# Patient Record
Sex: FEMALE | URBAN - METROPOLITAN AREA
[De-identification: names, ages, dates, MRNs, and addresses within clinical notes are randomized per-mention and may not be internally consistent; named-entity substitution may affect disease eponyms.]

---

## 2023-05-25 ENCOUNTER — OFFICE VISIT (OUTPATIENT)
Dept: URBAN - METROPOLITAN AREA CLINIC 17 | Facility: CLINIC | Age: 73
End: 2023-05-25

## 2023-05-25 RX ORDER — FLUCONAZOLE 150 MG/1
TABLET ORAL
Qty: 1 UNSPECIFIED | Status: ACTIVE | COMMUNITY

## 2023-05-25 RX ORDER — BIMATOPROST 0.1 MG/ML
SOLUTION/ DROPS OPHTHALMIC
Qty: 7.5 MILLILITER | Status: ACTIVE | COMMUNITY

## 2023-05-25 RX ORDER — PREDNISONE 20 MG/1
TABLET ORAL
Qty: 20 TABLET | Status: ACTIVE | COMMUNITY

## 2023-05-25 RX ORDER — NYSTATIN 100000 [USP'U]/G
APPLY DAILY TO SKIN TO AFFECTED AREA TWICE A DAY FOR 2 WEEKS CREAM TOPICAL
Qty: 150 GRAM | Refills: 0 | Status: ACTIVE | COMMUNITY

## 2023-05-25 RX ORDER — DILTIAZEM HYDROCHLORIDE 240 MG/1
TAKE 1 CAPSULE BY MOUTH EVERY DAY FOR 90 DAYS CAPSULE, EXTENDED RELEASE ORAL
Qty: 90 EACH | Refills: 1 | Status: ACTIVE | COMMUNITY

## 2023-05-25 RX ORDER — GABAPENTIN 300 MG/1
CAPSULE ORAL
Qty: 270 CAPSULE | Status: ACTIVE | COMMUNITY

## 2023-05-25 RX ORDER — OLMESARTAN MEDOXOMIL 20 MG/1
TABLET, FILM COATED ORAL
Qty: 90 TABLET | Status: ACTIVE | COMMUNITY

## 2023-05-25 RX ORDER — ALBUTEROL SULFATE 90 UG/1
INHALE 2 PUFFS INTO THE LUNGS EVERY 4 HOURS AS NEEDED FOR 30 DAYS AEROSOL, METERED RESPIRATORY (INHALATION)
Qty: 8.5 GRAM | Refills: 1 | Status: ACTIVE | COMMUNITY

## 2023-05-25 RX ORDER — AZITHROMYCIN DIHYDRATE 500 MG/1
TABLET, FILM COATED ORAL
Qty: 10 TABLET | Status: ACTIVE | COMMUNITY

## 2023-05-25 RX ORDER — FLUTICASONE FUROATE, UMECLIDINIUM BROMIDE AND VILANTEROL TRIFENATATE 100; 62.5; 25 UG/1; UG/1; UG/1
POWDER RESPIRATORY (INHALATION)
Qty: 180 BLISTER | Status: ACTIVE | COMMUNITY

## 2023-06-05 ENCOUNTER — OFFICE VISIT (OUTPATIENT)
Dept: URBAN - METROPOLITAN AREA CLINIC 17 | Facility: CLINIC | Age: 73
End: 2023-06-05

## 2023-07-11 ENCOUNTER — TELEPHONE ENCOUNTER (OUTPATIENT)
Dept: URBAN - METROPOLITAN AREA CLINIC 92 | Facility: CLINIC | Age: 73
End: 2023-07-11

## 2023-07-12 ENCOUNTER — OFFICE VISIT (OUTPATIENT)
Dept: URBAN - METROPOLITAN AREA CLINIC 92 | Facility: CLINIC | Age: 73
End: 2023-07-12

## 2023-07-12 NOTE — HPI-TODAY'S VISIT:
73-year-old female patient presents today due to cholelithiasis.  She has history of COPD, hypertension, CKD, iron deficiency anemia, prediabetes, HLD, oxygen dependent.  Labs 6- demonstrate , bilirubin 0.2, AST 10, ALT 5 Ultrasound 4- demonstrated midpole renal cortex of the left kidney demonstrates a mass affect measuring 2.8 x 2.6 cm it is indeterminate as to whether or not this is fetal lobulation presenting is a pseudo mass effect or true solid tumor correlate with the CT.  Mild hepatic steatosis.  Evidence of cholelithiasis without evidence of bile duct obstruction.  Mobile gallstones with shadowing are present in the gallbladder with the largest gallstone measuring 0.83 cm

## 2023-08-22 ENCOUNTER — OFFICE VISIT (OUTPATIENT)
Dept: URBAN - METROPOLITAN AREA CLINIC 92 | Facility: CLINIC | Age: 73
End: 2023-08-22

## 2023-08-22 NOTE — HPI-TODAY'S VISIT:
73-year-old female patient presents today with complaints of gallstones.  She was referred to us by Dr. Chelo Cadet a copy of this note will be sent to referring provider.  Ultrasound 4- demonstrated midpole renal cortex of the left kidney demonstrates a mass effect measuring 2.8 x 2.6 cm it is indeterminate as to whether or not this is fetal lobulation presenting is a pseudo mass effect or true solid tumor consider CT.  Left kidney demonstrates simple cyst.  Mild hepatic steatosis.  Cholelithiasis without evidence of bile duct obstruction, largest gallstone 0.63 cm

## 2023-09-19 ENCOUNTER — OFFICE VISIT (OUTPATIENT)
Dept: URBAN - METROPOLITAN AREA CLINIC 92 | Facility: CLINIC | Age: 73
End: 2023-09-19

## 2023-09-19 RX ORDER — FLUTICASONE FUROATE, UMECLIDINIUM BROMIDE AND VILANTEROL TRIFENATATE 100; 62.5; 25 UG/1; UG/1; UG/1
POWDER RESPIRATORY (INHALATION)
Qty: 180 BLISTER | Status: ACTIVE | COMMUNITY

## 2023-09-19 RX ORDER — BIMATOPROST 0.1 MG/ML
SOLUTION/ DROPS OPHTHALMIC
Qty: 7.5 MILLILITER | Status: ACTIVE | COMMUNITY

## 2023-09-19 RX ORDER — AZITHROMYCIN DIHYDRATE 500 MG/1
TABLET, FILM COATED ORAL
Qty: 10 TABLET | Status: ACTIVE | COMMUNITY

## 2023-09-19 RX ORDER — PREDNISONE 20 MG/1
TABLET ORAL
Qty: 20 TABLET | Status: ACTIVE | COMMUNITY

## 2023-09-19 RX ORDER — FLUCONAZOLE 150 MG/1
TABLET ORAL
Qty: 1 UNSPECIFIED | Status: ACTIVE | COMMUNITY

## 2023-09-19 RX ORDER — ALBUTEROL SULFATE 90 UG/1
INHALE 2 PUFFS INTO THE LUNGS EVERY 4 HOURS AS NEEDED FOR 30 DAYS AEROSOL, METERED RESPIRATORY (INHALATION)
Qty: 8.5 GRAM | Refills: 1 | Status: ACTIVE | COMMUNITY

## 2023-09-19 RX ORDER — DILTIAZEM HYDROCHLORIDE 240 MG/1
TAKE 1 CAPSULE BY MOUTH EVERY DAY FOR 90 DAYS CAPSULE, EXTENDED RELEASE ORAL
Qty: 90 EACH | Refills: 1 | Status: ACTIVE | COMMUNITY

## 2023-09-19 RX ORDER — OLMESARTAN MEDOXOMIL 20 MG/1
TABLET, FILM COATED ORAL
Qty: 90 TABLET | Status: ACTIVE | COMMUNITY

## 2023-09-19 RX ORDER — NYSTATIN 100000 [USP'U]/G
APPLY DAILY TO SKIN TO AFFECTED AREA TWICE A DAY FOR 2 WEEKS CREAM TOPICAL
Qty: 150 GRAM | Refills: 0 | Status: ACTIVE | COMMUNITY

## 2023-09-19 RX ORDER — GABAPENTIN 300 MG/1
CAPSULE ORAL
Qty: 270 CAPSULE | Status: ACTIVE | COMMUNITY

## 2023-09-19 NOTE — HPI-TODAY'S VISIT:
Patient is a 73 year old female with a PMH of COPD, HTN, CKD, iron deficiency anemia, home O2 dependent who presents for cholelithiasis. Abdominal US on 4/11/23 revealed midpole renal cortex of left kidney demonstrates a mass effect measuring 2.8 x 2.6cm. Indeterminate as to whether or not this is a fetal lobulation presenting is a pseudo mass effect or true solid tumor. Left kidney demonstrates a simple cyst. Liver is normal in size and contoiur with mild hepatic steatosis. Evidence of cholelithiasis w/o  evidence of bile duct obstruction